# Patient Record
Sex: MALE | Race: WHITE | NOT HISPANIC OR LATINO | Employment: OTHER | URBAN - METROPOLITAN AREA
[De-identification: names, ages, dates, MRNs, and addresses within clinical notes are randomized per-mention and may not be internally consistent; named-entity substitution may affect disease eponyms.]

---

## 2017-09-01 ENCOUNTER — APPOINTMENT (OUTPATIENT)
Dept: GENERAL RADIOLOGY | Facility: CLINIC | Age: 67
DRG: 382 | End: 2017-09-01
Attending: NURSE PRACTITIONER
Payer: COMMERCIAL

## 2017-09-01 ENCOUNTER — APPOINTMENT (OUTPATIENT)
Dept: CT IMAGING | Facility: CLINIC | Age: 67
DRG: 382 | End: 2017-09-01
Attending: EMERGENCY MEDICINE
Payer: COMMERCIAL

## 2017-09-01 ENCOUNTER — APPOINTMENT (OUTPATIENT)
Dept: ULTRASOUND IMAGING | Facility: CLINIC | Age: 67
DRG: 382 | End: 2017-09-01
Attending: NURSE PRACTITIONER
Payer: COMMERCIAL

## 2017-09-01 ENCOUNTER — HOSPITAL ENCOUNTER (INPATIENT)
Facility: CLINIC | Age: 67
LOS: 2 days | Discharge: HOME | DRG: 382 | End: 2017-09-03
Attending: NURSE PRACTITIONER | Admitting: FAMILY MEDICINE
Payer: COMMERCIAL

## 2017-09-01 DIAGNOSIS — R11.2 INTRACTABLE VOMITING WITH NAUSEA, UNSPECIFIED VOMITING TYPE: ICD-10-CM

## 2017-09-01 DIAGNOSIS — K29.00 ACUTE GASTRITIS WITHOUT HEMORRHAGE, UNSPECIFIED GASTRITIS TYPE: Primary | ICD-10-CM

## 2017-09-01 DIAGNOSIS — Z79.1 ENCOUNTER FOR LONG-TERM (CURRENT) USE OF NON-STEROIDAL ANTI-INFLAMMATORIES: ICD-10-CM

## 2017-09-01 DIAGNOSIS — K31.1 GASTRIC OUTLET OBSTRUCTION: ICD-10-CM

## 2017-09-01 LAB
ALBUMIN SERPL-MCNC: 4.6 G/DL (ref 3.4–5)
ALP SERPL-CCNC: 54 U/L (ref 40–150)
ALT SERPL W P-5'-P-CCNC: 27 U/L (ref 0–70)
AMYLASE SERPL-CCNC: 69 U/L (ref 30–110)
ANION GAP SERPL CALCULATED.3IONS-SCNC: 7 MMOL/L (ref 3–14)
AST SERPL W P-5'-P-CCNC: 19 U/L (ref 0–45)
BASOPHILS # BLD AUTO: 0 10E9/L (ref 0–0.2)
BASOPHILS NFR BLD AUTO: 0.3 %
BILIRUB SERPL-MCNC: 0.8 MG/DL (ref 0.2–1.3)
BUN SERPL-MCNC: 22 MG/DL (ref 7–30)
CALCIUM SERPL-MCNC: 11.5 MG/DL (ref 8.5–10.1)
CHLORIDE SERPL-SCNC: 101 MMOL/L (ref 94–109)
CO2 SERPL-SCNC: 32 MMOL/L (ref 20–32)
CREAT SERPL-MCNC: 1.35 MG/DL (ref 0.66–1.25)
DIFFERENTIAL METHOD BLD: NORMAL
EOSINOPHIL # BLD AUTO: 0 10E9/L (ref 0–0.7)
EOSINOPHIL NFR BLD AUTO: 0.1 %
ERYTHROCYTE [DISTWIDTH] IN BLOOD BY AUTOMATED COUNT: 12.6 % (ref 10–15)
GFR SERPL CREATININE-BSD FRML MDRD: 53 ML/MIN/1.7M2
GLUCOSE SERPL-MCNC: 117 MG/DL (ref 70–99)
HCT VFR BLD AUTO: 46.2 % (ref 40–53)
HGB BLD-MCNC: 15.6 G/DL (ref 13.3–17.7)
IMM GRANULOCYTES # BLD: 0 10E9/L (ref 0–0.4)
IMM GRANULOCYTES NFR BLD: 0.2 %
LIPASE SERPL-CCNC: 442 U/L (ref 73–393)
LYMPHOCYTES # BLD AUTO: 1.3 10E9/L (ref 0.8–5.3)
LYMPHOCYTES NFR BLD AUTO: 13.5 %
MCH RBC QN AUTO: 29.9 PG (ref 26.5–33)
MCHC RBC AUTO-ENTMCNC: 33.8 G/DL (ref 31.5–36.5)
MCV RBC AUTO: 89 FL (ref 78–100)
MONOCYTES # BLD AUTO: 0.5 10E9/L (ref 0–1.3)
MONOCYTES NFR BLD AUTO: 4.6 %
NEUTROPHILS # BLD AUTO: 8.1 10E9/L (ref 1.6–8.3)
NEUTROPHILS NFR BLD AUTO: 81.3 %
PLATELET # BLD AUTO: 336 10E9/L (ref 150–450)
POTASSIUM SERPL-SCNC: 4.2 MMOL/L (ref 3.4–5.3)
PROT SERPL-MCNC: 8.5 G/DL (ref 6.8–8.8)
RBC # BLD AUTO: 5.22 10E12/L (ref 4.4–5.9)
SODIUM SERPL-SCNC: 140 MMOL/L (ref 133–144)
WBC # BLD AUTO: 9.9 10E9/L (ref 4–11)

## 2017-09-01 PROCEDURE — 99223 1ST HOSP IP/OBS HIGH 75: CPT | Mod: AI | Performed by: FAMILY MEDICINE

## 2017-09-01 PROCEDURE — 96365 THER/PROPH/DIAG IV INF INIT: CPT | Mod: 59 | Performed by: EMERGENCY MEDICINE

## 2017-09-01 PROCEDURE — 25000128 H RX IP 250 OP 636: Performed by: EMERGENCY MEDICINE

## 2017-09-01 PROCEDURE — 0DB68ZX EXCISION OF STOMACH, VIA NATURAL OR ARTIFICIAL OPENING ENDOSCOPIC, DIAGNOSTIC: ICD-10-PCS | Performed by: SURGERY

## 2017-09-01 PROCEDURE — 74177 CT ABD & PELVIS W/CONTRAST: CPT

## 2017-09-01 PROCEDURE — S0164 INJECTION PANTROPRAZOLE: HCPCS | Performed by: EMERGENCY MEDICINE

## 2017-09-01 PROCEDURE — 25000128 H RX IP 250 OP 636: Performed by: FAMILY MEDICINE

## 2017-09-01 PROCEDURE — 76705 ECHO EXAM OF ABDOMEN: CPT

## 2017-09-01 PROCEDURE — 43239 EGD BIOPSY SINGLE/MULTIPLE: CPT | Performed by: SURGERY

## 2017-09-01 PROCEDURE — 83690 ASSAY OF LIPASE: CPT | Performed by: FAMILY MEDICINE

## 2017-09-01 PROCEDURE — 96361 HYDRATE IV INFUSION ADD-ON: CPT | Performed by: EMERGENCY MEDICINE

## 2017-09-01 PROCEDURE — 82977 ASSAY OF GGT: CPT | Performed by: EMERGENCY MEDICINE

## 2017-09-01 PROCEDURE — 82150 ASSAY OF AMYLASE: CPT | Performed by: FAMILY MEDICINE

## 2017-09-01 PROCEDURE — 25000128 H RX IP 250 OP 636: Performed by: NURSE PRACTITIONER

## 2017-09-01 PROCEDURE — 25000125 ZZHC RX 250: Performed by: EMERGENCY MEDICINE

## 2017-09-01 PROCEDURE — 74020 XR ABDOMEN 2 VW: CPT

## 2017-09-01 PROCEDURE — 99285 EMERGENCY DEPT VISIT HI MDM: CPT | Mod: 25 | Performed by: EMERGENCY MEDICINE

## 2017-09-01 PROCEDURE — 96367 TX/PROPH/DG ADDL SEQ IV INF: CPT | Performed by: EMERGENCY MEDICINE

## 2017-09-01 PROCEDURE — 12000007 ZZH R&B INTERMEDIATE

## 2017-09-01 PROCEDURE — 96375 TX/PRO/DX INJ NEW DRUG ADDON: CPT | Performed by: EMERGENCY MEDICINE

## 2017-09-01 PROCEDURE — 99207 ZZC CDG-CHARGE REQUIRED MANUAL ENTRY: CPT | Performed by: FAMILY MEDICINE

## 2017-09-01 PROCEDURE — 85025 COMPLETE CBC W/AUTO DIFF WBC: CPT | Performed by: FAMILY MEDICINE

## 2017-09-01 PROCEDURE — 96376 TX/PRO/DX INJ SAME DRUG ADON: CPT | Performed by: EMERGENCY MEDICINE

## 2017-09-01 PROCEDURE — 80053 COMPREHEN METABOLIC PANEL: CPT | Performed by: FAMILY MEDICINE

## 2017-09-01 PROCEDURE — 99284 EMERGENCY DEPT VISIT MOD MDM: CPT | Mod: Z6 | Performed by: EMERGENCY MEDICINE

## 2017-09-01 RX ORDER — LIDOCAINE 40 MG/G
CREAM TOPICAL
Status: DISCONTINUED | OUTPATIENT
Start: 2017-09-01 | End: 2017-09-01

## 2017-09-01 RX ORDER — PROCHLORPERAZINE MALEATE 5 MG
5 TABLET ORAL EVERY 6 HOURS PRN
Status: DISCONTINUED | OUTPATIENT
Start: 2017-09-01 | End: 2017-09-01

## 2017-09-01 RX ORDER — NALOXONE HYDROCHLORIDE 0.4 MG/ML
.1-.4 INJECTION, SOLUTION INTRAMUSCULAR; INTRAVENOUS; SUBCUTANEOUS
Status: DISCONTINUED | OUTPATIENT
Start: 2017-09-01 | End: 2017-09-03 | Stop reason: HOSPADM

## 2017-09-01 RX ORDER — PROCHLORPERAZINE 25 MG
12.5 SUPPOSITORY, RECTAL RECTAL EVERY 12 HOURS PRN
Status: DISCONTINUED | OUTPATIENT
Start: 2017-09-01 | End: 2017-09-01

## 2017-09-01 RX ORDER — SODIUM CHLORIDE 9 MG/ML
1000 INJECTION, SOLUTION INTRAVENOUS CONTINUOUS
Status: DISCONTINUED | OUTPATIENT
Start: 2017-09-01 | End: 2017-09-02

## 2017-09-01 RX ORDER — ONDANSETRON 2 MG/ML
4 INJECTION INTRAMUSCULAR; INTRAVENOUS
Status: COMPLETED | OUTPATIENT
Start: 2017-09-01 | End: 2017-09-01

## 2017-09-01 RX ORDER — PROCHLORPERAZINE 25 MG
12.5 SUPPOSITORY, RECTAL RECTAL EVERY 12 HOURS PRN
Status: DISCONTINUED | OUTPATIENT
Start: 2017-09-01 | End: 2017-09-03 | Stop reason: HOSPADM

## 2017-09-01 RX ORDER — ONDANSETRON 4 MG/1
4 TABLET, ORALLY DISINTEGRATING ORAL
Status: DISCONTINUED | OUTPATIENT
Start: 2017-09-01 | End: 2017-09-01

## 2017-09-01 RX ORDER — ONDANSETRON 4 MG/1
4 TABLET, ORALLY DISINTEGRATING ORAL EVERY 6 HOURS PRN
Status: DISCONTINUED | OUTPATIENT
Start: 2017-09-01 | End: 2017-09-03 | Stop reason: HOSPADM

## 2017-09-01 RX ORDER — ONDANSETRON 2 MG/ML
4 INJECTION INTRAMUSCULAR; INTRAVENOUS ONCE
Status: COMPLETED | OUTPATIENT
Start: 2017-09-01 | End: 2017-09-01

## 2017-09-01 RX ORDER — ONDANSETRON 2 MG/ML
4 INJECTION INTRAMUSCULAR; INTRAVENOUS EVERY 6 HOURS PRN
Status: DISCONTINUED | OUTPATIENT
Start: 2017-09-01 | End: 2017-09-03 | Stop reason: HOSPADM

## 2017-09-01 RX ORDER — ONDANSETRON 2 MG/ML
4 INJECTION INTRAMUSCULAR; INTRAVENOUS EVERY 6 HOURS PRN
Status: DISCONTINUED | OUTPATIENT
Start: 2017-09-01 | End: 2017-09-01

## 2017-09-01 RX ORDER — SODIUM CHLORIDE, SODIUM LACTATE, POTASSIUM CHLORIDE, CALCIUM CHLORIDE 600; 310; 30; 20 MG/100ML; MG/100ML; MG/100ML; MG/100ML
INJECTION, SOLUTION INTRAVENOUS CONTINUOUS
Status: DISCONTINUED | OUTPATIENT
Start: 2017-09-02 | End: 2017-09-03

## 2017-09-01 RX ORDER — ONDANSETRON 4 MG/1
4 TABLET, ORALLY DISINTEGRATING ORAL EVERY 6 HOURS PRN
Status: DISCONTINUED | OUTPATIENT
Start: 2017-09-01 | End: 2017-09-01

## 2017-09-01 RX ORDER — LATANOPROST 50 UG/ML
1 SOLUTION/ DROPS OPHTHALMIC AT BEDTIME
COMMUNITY

## 2017-09-01 RX ORDER — HYDROMORPHONE HYDROCHLORIDE 1 MG/ML
.3-.5 INJECTION, SOLUTION INTRAMUSCULAR; INTRAVENOUS; SUBCUTANEOUS
Status: DISCONTINUED | OUTPATIENT
Start: 2017-09-01 | End: 2017-09-03

## 2017-09-01 RX ORDER — LATANOPROST 50 UG/ML
1 SOLUTION/ DROPS OPHTHALMIC AT BEDTIME
Status: DISCONTINUED | OUTPATIENT
Start: 2017-09-02 | End: 2017-09-03 | Stop reason: HOSPADM

## 2017-09-01 RX ORDER — METOCLOPRAMIDE 5 MG/1
5 TABLET ORAL EVERY 6 HOURS PRN
Status: DISCONTINUED | OUTPATIENT
Start: 2017-09-01 | End: 2017-09-03 | Stop reason: HOSPADM

## 2017-09-01 RX ORDER — METOCLOPRAMIDE HYDROCHLORIDE 5 MG/ML
5 INJECTION INTRAMUSCULAR; INTRAVENOUS EVERY 6 HOURS PRN
Status: DISCONTINUED | OUTPATIENT
Start: 2017-09-01 | End: 2017-09-03 | Stop reason: HOSPADM

## 2017-09-01 RX ORDER — POLYETHYLENE GLYCOL 3350 17 G/17G
1 POWDER, FOR SOLUTION ORAL DAILY
COMMUNITY

## 2017-09-01 RX ORDER — PROCHLORPERAZINE MALEATE 5 MG
5 TABLET ORAL EVERY 6 HOURS PRN
Status: DISCONTINUED | OUTPATIENT
Start: 2017-09-01 | End: 2017-09-03 | Stop reason: HOSPADM

## 2017-09-01 RX ORDER — ACETAMINOPHEN 325 MG/1
650 TABLET ORAL EVERY 4 HOURS PRN
Status: DISCONTINUED | OUTPATIENT
Start: 2017-09-01 | End: 2017-09-03 | Stop reason: HOSPADM

## 2017-09-01 RX ORDER — IOPAMIDOL 755 MG/ML
100 INJECTION, SOLUTION INTRAVASCULAR ONCE
Status: COMPLETED | OUTPATIENT
Start: 2017-09-01 | End: 2017-09-01

## 2017-09-01 RX ADMIN — ONDANSETRON 4 MG: 2 INJECTION INTRAMUSCULAR; INTRAVENOUS at 18:27

## 2017-09-01 RX ADMIN — SODIUM CHLORIDE 8 MG/HR: 9 INJECTION, SOLUTION INTRAVENOUS at 22:29

## 2017-09-01 RX ADMIN — SODIUM CHLORIDE 1000 ML: 9 INJECTION, SOLUTION INTRAVENOUS at 17:11

## 2017-09-01 RX ADMIN — IOPAMIDOL 100 ML: 755 INJECTION, SOLUTION INTRAVENOUS at 21:51

## 2017-09-01 RX ADMIN — SODIUM CHLORIDE 1000 ML: 9 INJECTION, SOLUTION INTRAVENOUS at 19:40

## 2017-09-01 RX ADMIN — PROCHLORPERAZINE EDISYLATE 10 MG: 5 INJECTION INTRAMUSCULAR; INTRAVENOUS at 19:36

## 2017-09-01 RX ADMIN — SODIUM CHLORIDE 66 ML: 9 INJECTION, SOLUTION INTRAVENOUS at 21:51

## 2017-09-01 RX ADMIN — ONDANSETRON 4 MG: 2 INJECTION INTRAMUSCULAR; INTRAVENOUS at 17:12

## 2017-09-01 RX ADMIN — DIATRIZOATE MEGLUMINE AND DIATRIZOATE SODIUM 240 ML: 660; 100 SOLUTION ORAL; RECTAL at 21:51

## 2017-09-01 RX ADMIN — SODIUM CHLORIDE 80 MG: 9 INJECTION, SOLUTION INTRAVENOUS at 22:26

## 2017-09-01 ASSESSMENT — ENCOUNTER SYMPTOMS
HEADACHES: 0
BACK PAIN: 0
BLOOD IN STOOL: 0
ABDOMINAL DISTENTION: 1
NAUSEA: 1
DIARRHEA: 1
FLANK PAIN: 0
CONSTIPATION: 1
CHILLS: 1
FATIGUE: 0
SHORTNESS OF BREATH: 0
DIZZINESS: 0
DYSURIA: 0
COUGH: 0
ACTIVITY CHANGE: 0
FEVER: 0
ABDOMINAL PAIN: 1
DIFFICULTY URINATING: 0
SORE THROAT: 0
VOMITING: 1
APPETITE CHANGE: 1

## 2017-09-01 NOTE — ED PROVIDER NOTES
"  History     Chief Complaint   Patient presents with     Vomiting     going between constipation and diarrhea all week, vomiting last 24 hours     HPI  Diogo Wilburn is a 67 year old male with history of hyperlipidemia who presents to the emergency department for evaluation of vomiting and abdominal pain.  Patient describes a chronic history of diarrhea alternating with constipation for the last 4-5 years.  Reports over the last week he has had issues with diarrhea and constipation and has been trying multiple OTC oral medications (duclulax and mirilax).  Last evening he started having chills and nausea.  Vomiting started during the night. Three more episodes of vomiting and upper abdominal pain today. Vomiting consists of undigested food and liquid.  Denies any vomiting blood. Abdomen feels \"bloated\". Unable to eat or drink.  Patient 1-2 ETOH drinks a day (Vodka in the warmer months and Iveth in the cold months).  Quite smoking several years ago.     I have reviewed the Medications, Allergies, Past Medical and Surgical History, and Social History in the Epic system.    Meds: Fenofibrate and Simvastatin  PMH: hyperlipidemia, hypertriglyceridemia. Gaucoma.   Surg hx: umbilical hernia repair  Soc hx: Quit smoking in 1984. ETOH 1-2 liquor drinks daily (vodka or iveth). Denies illicit drug use.     Review of Systems   Constitutional: Positive for appetite change and chills. Negative for activity change, fatigue and fever.   HENT: Negative for congestion, ear pain and sore throat.    Respiratory: Negative for cough and shortness of breath.    Cardiovascular: Negative for chest pain.   Gastrointestinal: Positive for abdominal distention, abdominal pain, constipation, diarrhea, nausea and vomiting. Negative for blood in stool.   Genitourinary: Negative for difficulty urinating, dysuria and flank pain.   Musculoskeletal: Negative for back pain.   Skin: Negative for rash.   Neurological: Negative for dizziness and " headaches.       Physical Exam   BP: 153/83  Heart Rate: 61  Temp: 98.2  F (36.8  C)  Resp: 15  SpO2: 98 %  Physical Exam   Constitutional: He is oriented to person, place, and time. He appears well-developed and well-nourished. He appears distressed.   HENT:   Head: Normocephalic and atraumatic.   Right Ear: External ear normal.   Left Ear: External ear normal.   Nose: Nose normal.   Mouth/Throat: Oropharynx is clear and moist.   Eyes: Conjunctivae and EOM are normal.   Neck: Normal range of motion. Neck supple.   Cardiovascular: Normal rate, regular rhythm and normal heart sounds.    No murmur heard.  Pulmonary/Chest: Effort normal and breath sounds normal. No respiratory distress. He has no wheezes. He has no rales.   Abdominal: Soft. Bowel sounds are decreased. There is no hepatosplenomegaly. There is tenderness in the right upper quadrant and epigastric area. There is no rigidity, no rebound and no guarding.       Musculoskeletal: Normal range of motion.   Neurological: He is alert and oriented to person, place, and time.   Skin: Skin is warm and dry.       ED Course     ED Course     Procedures            Results for orders placed or performed during the hospital encounter of 09/01/17 (from the past 48 hour(s))   CBC with platelets, differential   Result Value Ref Range    WBC 9.9 4.0 - 11.0 10e9/L    RBC Count 5.22 4.4 - 5.9 10e12/L    Hemoglobin 15.6 13.3 - 17.7 g/dL    Hematocrit 46.2 40.0 - 53.0 %    MCV 89 78 - 100 fl    MCH 29.9 26.5 - 33.0 pg    MCHC 33.8 31.5 - 36.5 g/dL    RDW 12.6 10.0 - 15.0 %    Platelet Count 336 150 - 450 10e9/L    Diff Method Automated Method     % Neutrophils 81.3 %    % Lymphocytes 13.5 %    % Monocytes 4.6 %    % Eosinophils 0.1 %    % Basophils 0.3 %    % Immature Granulocytes 0.2 %    Absolute Neutrophil 8.1 1.6 - 8.3 10e9/L    Absolute Lymphocytes 1.3 0.8 - 5.3 10e9/L    Absolute Monocytes 0.5 0.0 - 1.3 10e9/L    Absolute Eosinophils 0.0 0.0 - 0.7 10e9/L    Absolute  Basophils 0.0 0.0 - 0.2 10e9/L    Abs Immature Granulocytes 0.0 0 - 0.4 10e9/L   Comprehensive metabolic panel   Result Value Ref Range    Sodium 140 133 - 144 mmol/L    Potassium 4.2 3.4 - 5.3 mmol/L    Chloride 101 94 - 109 mmol/L    Carbon Dioxide 32 20 - 32 mmol/L    Anion Gap 7 3 - 14 mmol/L    Glucose 117 (H) 70 - 99 mg/dL    Urea Nitrogen 22 7 - 30 mg/dL    Creatinine 1.35 (H) 0.66 - 1.25 mg/dL    GFR Estimate 53 (L) >60 mL/min/1.7m2    GFR Estimate If Black 64 >60 mL/min/1.7m2    Calcium 11.5 (H) 8.5 - 10.1 mg/dL    Bilirubin Total 0.8 0.2 - 1.3 mg/dL    Albumin 4.6 3.4 - 5.0 g/dL    Protein Total 8.5 6.8 - 8.8 g/dL    Alkaline Phosphatase 54 40 - 150 U/L    ALT 27 0 - 70 U/L    AST 19 0 - 45 U/L   Lipase   Result Value Ref Range    Lipase 442 (H) 73 - 393 U/L   Amylase   Result Value Ref Range    Amylase 69 30 - 110 U/L   XR Abdomen 2 Views    Narrative    XR ABDOMEN 2 VW   9/1/2017 5:29 PM      HISTORY:  vomiting, constipation    COMPARISON: None.    FINDINGS: The gas pattern is normal. No free air. There are radiopaque  densities projected over the right lower quadrant. These are probably  within the GI tract and are probably secondary to enteric tablets. The  stomach appears to be distended.      Impression    IMPRESSION:  No free air. No obstruction identified.. Stomach appears  distended.         Labs Ordered and Resulted from Time of ED Arrival Up to the Time of Departure from the ED - No data to display    Assessments & Plan (with Medical Decision Making)  Diogo Wilburn is a 67 year old male with history of hyperlipidemia who presents to the emergency department for evaluation of vomiting and abdominal pain.  Patient describes a chronic history of diarrhea alternating with constipation for the last 4-5 years.  Reports over the last week he has had issues with diarrhea and constipation and has been trying multiple OTC oral medications (duclulax and mirilax).  Last evening he started having chills  "and nausea.  Vomiting started during the night. Three more episodes of vomiting and upper abdominal pain today. Vomiting consists of undigested food and liquid.  Denies any vomiting blood. Abdomen feels \"bloated\". Unable to eat or drink.  Patient 1-2 ETOH drinks a day (Vodka in the warmer months and Iveth in the cold months).  Quite smoking several years ago.     On exam patient is pleasant, alert and oriented. Does not appear distressed.  Lung sounds CTA. No respiratory distress. Tenderness with palpation of the right upper quadrant and epigastric region.  Bowel sounds are hypoactive. Patient's abdomen is soft, but does appear distended. CBC normal. Lipase elevated at 442. Amylase is normal. LFTs are normal. Creatinine is elevated at 1.35. GFR low at 53. BUN is normal. Abdominal xray reveals no free air or obstruction. Stomach appears distended on xray. I discussed labs and abdominal xray with patient. Patient has another vomiting episode of non-bloody emesis approximately 300cc. Given his abdominal tenderness and elevated lipase ultrasound was ordered. I discussed patient's history, exam, lab and imaging findings thus far with Dr. Tyree Siddiqi, emergency provider who I signed patient out to and will assume care of patient here in the ED.   8:34 PM  I reviewed the flat and upright abdominal x-ray along with ultrasound report.  Concerns for the patient's epigastric pain along with recurrent vomiting is that of gastric obstruction.  Undetermined etiology.  Patient has significant gastric distention along with recurrent vomiting in the ED.  He is continuing to vomit despite multiple antiemetic agents.  NG tube will be placed.  CT with IV contrast only following placement of NG tube.  Hospital admission is planned.  Discussed admission with Cleveland Garcia's M.D. -accepting.  Patient in agreement.  Wife was sent home for CPAP device.  8:48 PM  Plan to start PPI therapy IV bolus followed by drip.  Discussed care with Matthew" Christian DIAZ-reconsult completed.  Tentatively scheduled for EGD 9 AM tomorrow morning.  Surgeries requesting CT to be completed with contrast of NG tube to see if it completed obstructed the pylorus of some contrast is getting through.  Discussed this with the CT technician who will discuss this with the radiologist.     Patient's had no night sweats or unexplained weight loss.  Does use heavy NSAID use for chronic back pain.         I have reviewed the nursing notes.    I have reviewed the findings, diagnosis, plan and need for follow up with the patient.    New Prescriptions    No medications on file       Final diagnoses:   Gastric outlet obstruction   Intractable vomiting with nausea, unspecified vomiting type   Heavy NSAID use  Moderate to heavy alcohol use    9/1/2017   [facility] EMERGENCY DEPARTMENT     Diogo Siddiqi DO  09/01/17 7533

## 2017-09-01 NOTE — ED NOTES
BS hypoactive. Pt has not been able to keep fluids down x24 hours. Been on miralax previously, had not taken over last few days. Attempted OTC bowel meds (ex-lax)  while on vacation due to not having miralax with on trip.

## 2017-09-01 NOTE — IP AVS SNAPSHOT
[facility]    [address] [address]    Phone:  [phone]    Fax:  [phone]                                       After Visit Summary   9/1/2017    Diogo Wilburn    MRN: [id number]           After Visit Summary Signature Page     I have received my discharge instructions, and my questions have been answered. I have discussed any challenges I see with this plan with the nurse or doctor.    ..........................................................................................................................................  Patient/Patient Representative Signature      ..........................................................................................................................................  Patient Representative Print Name and Relationship to Patient    ..................................................               ................................................  Date                                            Time    ..........................................................................................................................................  Reviewed by Signature/Title    ...................................................              ..............................................  Date                                                            Time

## 2017-09-01 NOTE — IP AVS SNAPSHOT
MRN:[id number]                      After Visit Summary   9/1/2017    Diogo Wilburn    MRN: [id number]           Thank you!     Thank you for choosing [facility] for your care. Our goal is always to provide you with excellent care. Hearing back from our patients is one way we can continue to improve our services. Please take a few minutes to complete the written survey that you may receive in the mail after you visit with us. Thank you!        Patient Information     Date Of Birth          1950        Designated Caregiver       Most Recent Value    Caregiver    Will someone help with your care after discharge? yes    Name of designated caregiver  Corrie Wilburn wife     Phone number of caregiver [phone]    Caregiver address same as patients       About your hospital stay     You were admitted on:  September 1, 2017 You last received care in the:  [facility]    You were discharged on:  September 3, 2017       Who to Call     For medical emergencies, please call 911.  For non-urgent questions about your medical care, please call your primary care provider or clinic, None  For questions related to your surgery, please call your surgery clinic        Attending Provider     Provider Specialty    Starla Huang APRN CNP Nurse Practitioner - Pediatrics    Diogo Siddiqi,  Emergency Medicine    Cleveland Hollingsworth MD Family Practice    [facility], Dillan FERRER MD Family Practice       Primary Care Provider Fax #    [facility] [phone]      Further instructions from your care team       Follow-up with your primary care provider in about 1 week.  Have them recheck your hemoglobin and discuss how long to continue the twice daily omeprazole vs decreasing to once daily at that time.      Pending Results     No orders found from 8/30/2017 to 9/2/2017.            Statement of Approval     Ordered          09/03/17 1351  I have reviewed and agree  "with all the recommendations and orders detailed in this document.  EFFECTIVE NOW     Approved and electronically signed by:  Dillan Troy MD             Admission Information     Date & Time Provider Department Dept. Phone    2017 Dillan Troy MD [facility] Surgical [phone]      Your Vitals Were     Blood Pressure Pulse Temperature Respirations Height Weight    109/63 (BP Location: Left arm) 50 98.1  F (36.7  C) (Oral) 18 1.778 m (5' 10\") 99 kg (218 lb 4.1 oz)    Pulse Oximetry BMI (Body Mass Index)                96% 31.32 kg/m2          MyChart Information     The Simple lets you send messages to your doctor, view your test results, renew your prescriptions, schedule appointments and more. To sign up, go to [email or web address] . Click on \"Log in\" on the left side of the screen, which will take you to the Welcome page. Then click on \"Sign up Now\" on the right side of the page.     You will be asked to enter the access code listed below, as well as some personal information. Please follow the directions to create your username and password.     Your access code is: [email or web address]  Expires: 2017  6:27 PM     Your access code will  in 90 days. If you need help or a new code, please call your [facility] clinic or [phone].        Care EveryWhere ID     This is your Care EveryWhere ID. This could be used by other organizations to access your [facility] medical records  [id number]        Equal Access to Services     TOM LOUISE : Hadii klaus adams Sochristopher, waaxda luqadaha, qaybta kaalmasammi mcbride, emeka marques . So [facility] [phone].    ATENCIÓN: Si habla español, tiene a garcia disposición servicios gratuitos de asistencia lingüística. Llame al [phone].    We comply with applicable federal civil rights laws and [place] laws. We do not discriminate on the basis of race, color, national origin, age, disability sex, sexual orientation or " gender identity.               Review of your medicines      START taking        Dose / Directions    HYDROcodone-acetaminophen 5-325 MG per tablet   Commonly known as:  NORCO   Used for:  Acute gastritis without hemorrhage, unspecified gastritis type        Dose:  1 tablet   Take 1 tablet by mouth every 4 hours as needed for pain maximum 4 tablet(s) per day   Quantity:  10 tablet   Refills:  0       omeprazole 20 MG CR capsule   Commonly known as:  priLOSEC   Used for:  Acute gastritis without hemorrhage, unspecified gastritis type        Dose:  20 mg   Take 1 capsule (20 mg) by mouth 2 times daily   Quantity:  60 capsule   Refills:  1         CONTINUE these medicines which have NOT CHANGED        Dose / Directions    aspirin 81 MG tablet   Used for:  Unspecified disorder of lipoid metabolism        ONE DAILY   Quantity:  100   Refills:  3       FENOFIBRATE PO        Dose:  145 mg   Take 145 mg by mouth daily   Refills:  0       latanoprost 0.005 % ophthalmic solution   Commonly known as:  XALATAN        Dose:  1 drop   Place 1 drop into both eyes At Bedtime   Refills:  0       polyethylene glycol powder   Commonly known as:  MIRALAX/GLYCOLAX        Dose:  1 capful   Take 1 capful by mouth daily   Refills:  0       SIMVASTATIN PO        Dose:  20 mg   Take 20 mg by mouth At Bedtime   Refills:  0            Where to get your medicines      Some of these will need a paper prescription and others can be bought over the counter. Ask your nurse if you have questions.     Bring a paper prescription for each of these medications     HYDROcodone-acetaminophen 5-325 MG per tablet    omeprazole 20 MG CR capsule                Protect others around you: Learn how to safely use, store and throw away your medicines at www.disposemymeds.org.             Medication List: This is a list of all your medications and when to take them. Check marks below indicate your daily home schedule. Keep this list as a reference.       Medications           Morning Afternoon Evening Bedtime As Needed    aspirin 81 MG tablet   ONE DAILY                                FENOFIBRATE PO   Take 145 mg by mouth daily                                HYDROcodone-acetaminophen 5-325 MG per tablet   Commonly known as:  NORCO   Take 1 tablet by mouth every 4 hours as needed for pain maximum 4 tablet(s) per day                                latanoprost 0.005 % ophthalmic solution   Commonly known as:  XALATAN   Place 1 drop into both eyes At Bedtime   Last time this was given:  1 drop on 9/2/2017  9:01 PM                                omeprazole 20 MG CR capsule   Commonly known as:  priLOSEC   Take 1 capsule (20 mg) by mouth 2 times daily                                polyethylene glycol powder   Commonly known as:  MIRALAX/GLYCOLAX   Take 1 capful by mouth daily                                SIMVASTATIN PO   Take 20 mg by mouth At Bedtime   Last time this was given:  20 mg on 9/2/2017  9:01 PM

## 2017-09-02 ENCOUNTER — ANESTHESIA EVENT (OUTPATIENT)
Dept: GASTROENTEROLOGY | Facility: CLINIC | Age: 67
DRG: 382 | End: 2017-09-02
Payer: COMMERCIAL

## 2017-09-02 ENCOUNTER — ANESTHESIA (OUTPATIENT)
Dept: GASTROENTEROLOGY | Facility: CLINIC | Age: 67
DRG: 382 | End: 2017-09-02
Payer: COMMERCIAL

## 2017-09-02 LAB
ALBUMIN SERPL-MCNC: 3.7 G/DL (ref 3.4–5)
ALP SERPL-CCNC: 42 U/L (ref 40–150)
ALT SERPL W P-5'-P-CCNC: 20 U/L (ref 0–70)
ANION GAP SERPL CALCULATED.3IONS-SCNC: 8 MMOL/L (ref 3–14)
AST SERPL W P-5'-P-CCNC: 13 U/L (ref 0–45)
BILIRUB SERPL-MCNC: 0.8 MG/DL (ref 0.2–1.3)
BUN SERPL-MCNC: 22 MG/DL (ref 7–30)
CALCIUM SERPL-MCNC: 9.7 MG/DL (ref 8.5–10.1)
CHLORIDE SERPL-SCNC: 108 MMOL/L (ref 94–109)
CO2 SERPL-SCNC: 28 MMOL/L (ref 20–32)
CREAT SERPL-MCNC: 1.46 MG/DL (ref 0.66–1.25)
ERYTHROCYTE [DISTWIDTH] IN BLOOD BY AUTOMATED COUNT: 12.8 % (ref 10–15)
GFR SERPL CREATININE-BSD FRML MDRD: 48 ML/MIN/1.7M2
GGT SERPL-CCNC: 14 U/L (ref 0–75)
GLUCOSE SERPL-MCNC: 108 MG/DL (ref 70–99)
HCT VFR BLD AUTO: 41.2 % (ref 40–53)
HGB BLD-MCNC: 13.7 G/DL (ref 13.3–17.7)
INR PPP: 1.01 (ref 0.86–1.14)
LIPASE SERPL-CCNC: 219 U/L (ref 73–393)
MCH RBC QN AUTO: 29.9 PG (ref 26.5–33)
MCHC RBC AUTO-ENTMCNC: 33.3 G/DL (ref 31.5–36.5)
MCV RBC AUTO: 90 FL (ref 78–100)
PLATELET # BLD AUTO: 273 10E9/L (ref 150–450)
POTASSIUM SERPL-SCNC: 4 MMOL/L (ref 3.4–5.3)
PROT SERPL-MCNC: 6.9 G/DL (ref 6.8–8.8)
PTH-INTACT SERPL-MCNC: 33 PG/ML (ref 12–72)
RBC # BLD AUTO: 4.58 10E12/L (ref 4.4–5.9)
SODIUM SERPL-SCNC: 144 MMOL/L (ref 133–144)
UPPER GI ENDOSCOPY: NORMAL
WBC # BLD AUTO: 8.9 10E9/L (ref 4–11)

## 2017-09-02 PROCEDURE — 85610 PROTHROMBIN TIME: CPT | Performed by: FAMILY MEDICINE

## 2017-09-02 PROCEDURE — 88342 IMHCHEM/IMCYTCHM 1ST ANTB: CPT | Performed by: SURGERY

## 2017-09-02 PROCEDURE — 88305 TISSUE EXAM BY PATHOLOGIST: CPT | Performed by: SURGERY

## 2017-09-02 PROCEDURE — 25000128 H RX IP 250 OP 636: Performed by: EMERGENCY MEDICINE

## 2017-09-02 PROCEDURE — 83970 ASSAY OF PARATHORMONE: CPT | Performed by: FAMILY MEDICINE

## 2017-09-02 PROCEDURE — 25000132 ZZH RX MED GY IP 250 OP 250 PS 637: Performed by: FAMILY MEDICINE

## 2017-09-02 PROCEDURE — 25000128 H RX IP 250 OP 636: Performed by: NURSE ANESTHETIST, CERTIFIED REGISTERED

## 2017-09-02 PROCEDURE — 43251 EGD REMOVE LESION SNARE: CPT | Performed by: SURGERY

## 2017-09-02 PROCEDURE — 85027 COMPLETE CBC AUTOMATED: CPT | Performed by: FAMILY MEDICINE

## 2017-09-02 PROCEDURE — 37000008 ZZH ANESTHESIA TECHNICAL FEE, 1ST 30 MIN: Performed by: SURGERY

## 2017-09-02 PROCEDURE — 36415 COLL VENOUS BLD VENIPUNCTURE: CPT | Performed by: FAMILY MEDICINE

## 2017-09-02 PROCEDURE — 99232 SBSQ HOSP IP/OBS MODERATE 35: CPT | Performed by: FAMILY MEDICINE

## 2017-09-02 PROCEDURE — 25000125 ZZHC RX 250: Performed by: EMERGENCY MEDICINE

## 2017-09-02 PROCEDURE — 88342 IMHCHEM/IMCYTCHM 1ST ANTB: CPT | Mod: 26 | Performed by: SURGERY

## 2017-09-02 PROCEDURE — 80053 COMPREHEN METABOLIC PANEL: CPT | Performed by: FAMILY MEDICINE

## 2017-09-02 PROCEDURE — S0164 INJECTION PANTROPRAZOLE: HCPCS | Performed by: EMERGENCY MEDICINE

## 2017-09-02 PROCEDURE — 25000125 ZZHC RX 250: Performed by: NURSE ANESTHETIST, CERTIFIED REGISTERED

## 2017-09-02 PROCEDURE — 88305 TISSUE EXAM BY PATHOLOGIST: CPT | Mod: 26 | Performed by: SURGERY

## 2017-09-02 PROCEDURE — 83690 ASSAY OF LIPASE: CPT | Performed by: FAMILY MEDICINE

## 2017-09-02 PROCEDURE — 25000125 ZZHC RX 250: Performed by: FAMILY MEDICINE

## 2017-09-02 PROCEDURE — 12000000 ZZH R&B MED SURG/OB

## 2017-09-02 PROCEDURE — 12000007 ZZH R&B INTERMEDIATE

## 2017-09-02 RX ORDER — LIDOCAINE 40 MG/G
CREAM TOPICAL
Status: DISCONTINUED | OUTPATIENT
Start: 2017-09-02 | End: 2017-09-02 | Stop reason: HOSPADM

## 2017-09-02 RX ORDER — SODIUM CHLORIDE, SODIUM LACTATE, POTASSIUM CHLORIDE, CALCIUM CHLORIDE 600; 310; 30; 20 MG/100ML; MG/100ML; MG/100ML; MG/100ML
INJECTION, SOLUTION INTRAVENOUS CONTINUOUS PRN
Status: DISCONTINUED | OUTPATIENT
Start: 2017-09-02 | End: 2017-09-02

## 2017-09-02 RX ORDER — PROPOFOL 10 MG/ML
INJECTION, EMULSION INTRAVENOUS CONTINUOUS PRN
Status: DISCONTINUED | OUTPATIENT
Start: 2017-09-02 | End: 2017-09-02

## 2017-09-02 RX ORDER — LIDOCAINE HYDROCHLORIDE 10 MG/ML
INJECTION, SOLUTION EPIDURAL; INFILTRATION; INTRACAUDAL; PERINEURAL PRN
Status: DISCONTINUED | OUTPATIENT
Start: 2017-09-02 | End: 2017-09-02

## 2017-09-02 RX ORDER — GLYCOPYRROLATE 0.2 MG/ML
INJECTION, SOLUTION INTRAMUSCULAR; INTRAVENOUS PRN
Status: DISCONTINUED | OUTPATIENT
Start: 2017-09-02 | End: 2017-09-02

## 2017-09-02 RX ORDER — SIMVASTATIN 20 MG
20 TABLET ORAL AT BEDTIME
Status: DISCONTINUED | OUTPATIENT
Start: 2017-09-02 | End: 2017-09-03 | Stop reason: HOSPADM

## 2017-09-02 RX ORDER — PROPOFOL 10 MG/ML
INJECTION, EMULSION INTRAVENOUS PRN
Status: DISCONTINUED | OUTPATIENT
Start: 2017-09-02 | End: 2017-09-02

## 2017-09-02 RX ADMIN — PROPOFOL 1 MG: 10 INJECTION, EMULSION INTRAVENOUS at 11:51

## 2017-09-02 RX ADMIN — SODIUM CHLORIDE, POTASSIUM CHLORIDE, SODIUM LACTATE AND CALCIUM CHLORIDE: 600; 310; 30; 20 INJECTION, SOLUTION INTRAVENOUS at 11:46

## 2017-09-02 RX ADMIN — SIMVASTATIN 20 MG: 20 TABLET, FILM COATED ORAL at 21:01

## 2017-09-02 RX ADMIN — SODIUM CHLORIDE 8 MG/HR: 9 INJECTION, SOLUTION INTRAVENOUS at 03:23

## 2017-09-02 RX ADMIN — SODIUM CHLORIDE, POTASSIUM CHLORIDE, SODIUM LACTATE AND CALCIUM CHLORIDE: 600; 310; 30; 20 INJECTION, SOLUTION INTRAVENOUS at 14:37

## 2017-09-02 RX ADMIN — LATANOPROST 1 DROP: 50 SOLUTION/ DROPS OPHTHALMIC at 21:01

## 2017-09-02 RX ADMIN — LIDOCAINE HYDROCHLORIDE 100 MG: 10 INJECTION, SOLUTION EPIDURAL; INFILTRATION; INTRACAUDAL; PERINEURAL at 11:49

## 2017-09-02 RX ADMIN — PANTOPRAZOLE SODIUM 40 MG: 40 INJECTION, POWDER, FOR SOLUTION INTRAVENOUS at 19:29

## 2017-09-02 RX ADMIN — PROPOFOL 200 MCG/KG/MIN: 10 INJECTION, EMULSION INTRAVENOUS at 11:51

## 2017-09-02 RX ADMIN — ACETAMINOPHEN 650 MG: 325 TABLET, FILM COATED ORAL at 13:17

## 2017-09-02 RX ADMIN — SODIUM CHLORIDE, POTASSIUM CHLORIDE, SODIUM LACTATE AND CALCIUM CHLORIDE: 600; 310; 30; 20 INJECTION, SOLUTION INTRAVENOUS at 04:58

## 2017-09-02 RX ADMIN — GLYCOPYRROLATE 0.2 MG: 0.2 INJECTION, SOLUTION INTRAMUSCULAR; INTRAVENOUS at 11:49

## 2017-09-02 ASSESSMENT — LIFESTYLE VARIABLES: TOBACCO_USE: 1

## 2017-09-02 NOTE — PROGRESS NOTES
EGD today revealed retained food products, severe esophagitis and gastritis with multiple small antral ulcers.  Will leave NGT out for now, but may need to replace if he vomits.  He needs to avoid all NSAIDs and start BID PPIs.  The pylorus admitted the endoscope without difficulty, so he should be able to at least tolerate a liquid diet.    Matthew Gonzales MD FACS

## 2017-09-02 NOTE — ANESTHESIA CARE TRANSFER NOTE
Patient: Diogo FERRER Tetraernie    Procedure(s):   - Wound Class: II-Clean Contaminated    Diagnosis: abdominal pain  Diagnosis Additional Information: No value filed.    Anesthesia Type:   No value filed.     Note:  Airway :Room Air  Patient transferred to:Phase II        Vitals: (Last set prior to Anesthesia Care Transfer)    CRNA VITALS  9/2/2017 1138 - 9/2/2017 1208      9/2/2017             NIBP: 107/79    Pulse: 75    NIBP Mean: 95    SpO2: 97 %                Electronically Signed By: RAZIA Velázquez CRNA  September 2, 2017  12:08 PM

## 2017-09-02 NOTE — H&P
"CHIEF COMPLAINT:  Vomiting for 24 hours.      HISTORY OF PRESENT ILLNESS:  Diogo Wilburn is a 67-year-old male with a history of hyperlipidemia.  He uses a lot of ibuprofen for back pain.  He takes an aspirin a day.  He says he drinks two alcoholic drinks per day.  He has had a lot of heartburn, some epigastric pain, and what he calls \"reflux\" in the last two or three months.  In the last week, he says, he has been alternating between diarrhea and constipation.  He has tried Dulcolax and MiraLax.  Twenty-four hours prior to admission, he had some chills, he started vomiting, and he had upper abdominal pain.  He had some undigested food in his vomit.  No blood in his vomit.  He has been unable to eat or drink today.      He was evaluated in the emergency room.  His white count was normal.  He was afebrile.  His creatinine was 1.35; I am not sure what his baseline is.  His calcium was slightly elevated at 11.5.  He had normal liver enzyme values.      He had a plain film of his abdomen done that was unremarkable.      An abdominal ultrasound was done which showed no cholelithiasis or cholecystitis.  He had possible hepatomegaly.  He had a fluid and debris-filled structure anteriorly, probably the stomach, based on the appearance on plain film.  Gastric outlet obstruction was queried.  CT scan was suggested.      The patient is going to proceed with CT of his abdomen.  An NG is being placed.  He is going to be given Protonix, and continued on a Protonix drip.      PAST MEDICAL HISTORY:   1.  Chronic back pain.   2.  History of glucose intolerance.   3.  Hyperlipidemia.      PAST SURGICAL HISTORY:   1.  Bilateral carpal tunnel release.   2.  Vasectomy.   3.  Umbilical hernia repair.   4.  Colonoscopy.      FAMILY HISTORY:  His mother had hypertension.  His father had heart disease.  Maternal grandfather with heart disease.      HABITS:  He quit smoking in 1984.  He reports two drinks per day of alcohol.    "   ALLERGIES:  No known allergies.      SOCIAL HISTORY:  He lives locally.  He goes to the [facility].      MEDICATIONS:   1.  MiraLax 1 capful daily.   2.  Xalatan eyedrops 0.005% 1 drop both eyes at bedtime.   3.  Simvastatin 20 mg at bedtime.   4.  Fenofibrate 145 mg daily.   5.  Aspirin 81 mg daily.      REVIEW OF SYSTEMS:  He has had some chills, no documented fever.  No headache.  No chest pain.  No shortness of breath.  He has had recent heartburn-type symptoms.  He has had recent epigastric discomfort and vomiting.  He has had some alternating constipation with loose stools recently.  He has had no blood in his vomit or stool.  No urinary tract symptoms.  No skin symptoms.  No neurological symptoms.  The rest of the 10-point review of systems is negative.      PHYSICAL EXAMINATION:   GENERAL:  He is alert, pleasant.   VITAL SIGNS:  Temperature 98.2, heart rate 62, blood pressure 174/109 (this is the highest value he has had so far), respiratory rate 18, sats 96% on room air.   HEENT:  Ears negative.  Oral negative.   NECK:  Supple.   LUNGS:  He had just a few bibasilar crackles.   COR:  S1, S2, without click, rub or murmur.   ABDOMEN:  Obese.  Some vague epigastric tenderness.  There is no guarding, rebound, rigidity or mass.  Bowel sounds were normal.   GENITALIA:  Grossly normal.   EXTREMITIES:  Negative.  No edema.  Radha sign negative.   SKIN:  Negative.      LABORATORY DATA:  Imaging as above.  CT is pending.      His lipase was 442.  His creatinine was 1.35.  Glucose 117.  CBC was normal.  Calcium was 11.5.      ASSESSMENT:   1.  Persistent vomiting and epigastric discomfort, rule out gastric outlet obstruction.   2.  Recent GERD-type symptoms.   3.  Mildly elevated creatinine, unclear chronicity.   4.  Moderately elevated calcium, unclear etiology.   5.  History of aspirin, NSAID, and alcohol use.   6.  Very mildly elevated lipase.      PLAN:   1.  He is going to proceed with CT of the abdomen.    2.  There is some concern for gastric outlet obstruction.  An NG is being placed.   3.  Tentatively, he will probably need an EGD.  Dr. Siddiqi is going to talk to Dr. Gonzales about that, and I am ordering that for tomorrow.   4.  We will give him fluids.   5.  We will need to follow his creatinine.   6.  We will need to see what his calcium is tomorrow.   7.  I will order a parathyroid hormone for tomorrow.   8.  Prophylaxis will be with sequential compression devices.   9.  I am holding his oral medications at this time.   10.  He has no history of withdrawal or alcohol-related problems.  I did not put him on CIWA at this time, but we will watch him closely.   11.  Pancreatitis would be another possibility.  His lipase, however, is just slightly elevated.  Again, we will see what the CT scan shows, and base further testing on that.         MELINDA ACOSTA MD             D: 2017 21:21   T: 2017 01:04   MT: VANDANA#[id number]      Name:     JASIEL FERRARO   MRN:      [id number]        Account:      [id number]   :      1950           Admitted:     [id number]      Document: [id number]       cc: [facility]

## 2017-09-02 NOTE — PLAN OF CARE
Problem: Goal Outcome Summary  Goal: Goal Outcome Summary  Outcome: Improving  Patient denies any nausea, vomiting or pain. Patient voiding, using urinal. NG tube in place and set to low intermittent suction, pulling out brown fluid, see output for amount. IV infusing and Protonix drip infusing. NPO after midnight. EGD information in room.

## 2017-09-02 NOTE — PLAN OF CARE
Problem: Goal Outcome Summary  Goal: Goal Outcome Summary  VSS, no further nausea, no ABD discomfort.  Pt states that he does feel much improved after large, loose BM this morning and has been passing large amounts of flatus.  NG to LIS with 150 cc green contents out so far this morning.  EGD today.

## 2017-09-02 NOTE — PROGRESS NOTES
Report given to Ella ALVAREZ R.N. In Med-Surg. Pt. Is awake and alert. Taking in ice-chips and codie. Well. Denies nausea/pain.

## 2017-09-02 NOTE — PROGRESS NOTES
[facility] Hospitalist Progress Note           Assessment and Plan:     Persistent vomiting and epigastric discomfort, rule out gastric outlet obstruction with recent GERD-type symptoms - suspect due to partial gastric outlet obstruction due to severe gastritis as seen on EGD  9/1/17 -- There is some concern for gastric outlet obstruction.  An NG is being placed. EGD tomorrow  9/2/2017 -- appears to be due to narrowing but not complete obstruction of gastric outlet due to severe gastritis in turn due to extensive NSAID use and daily alcohol use.  Recommend completely stopping alcohol and NSAIDS for now, including stopping daily aspirin.  Transition to twice daily protonix IV from drip, plan to continue twice daily oral dosing on discharge.  Use tylenol for chronic back pain - would recommend avoiding narcotics for this if able, but would prefer these over ongoing  NSAID use in this case at least in the short term.  Continue on liquids tonight, advance to soft diet in AM if doing well.     Mildly elevated creatinine, unclear chronicity.   9/2/2017 -- faintly up - follow.      Moderately elevated calcium, unclear etiology.   9/2/2017 -- resolved.    Very mildly elevated lipase.   9/2/2017 -- normalized overnight, doubt pancreatitis.        Daily Alcohol use  9/1/17 -- 2 drinks per day. He has no history of withdrawal or alcohol-related problems.  I did not put him on CIWA at this time, but we will watch him closely.   9/2/2017 -- doing well, no symptoms of withdrawal.  Follow.      Prophylaxis  Prophylaxis will be with sequential compression devices.    Lines  PIV    Disposition  Improving, hope for discharge tomorrow afternoon if tolerates advanced diet but may need slower advancement.              Interval History:   Much improved - pain essentially gone, no nausea or vomiting or feeling of distention of abdomen.  Just starting on liquids, no fever or chills.  No new concerns.            Review of Systems:     "ROS: 10 point ROS neg other than the symptoms noted above in the HPI.             Medications:   Current active medications and PTA medications reviewed, see medication list for details.            Physical Exam:   Vitals were reviewed  Patient Vitals for the past 24 hrs:   BP Temp Temp src Pulse Heart Rate Resp SpO2 Height Weight   17 1309 141/74 - - 62 - 16 97 % - -   17 1245 131/81 - - - 58 16 98 % - -   17 1230 142/85 - - - 56 16 96 % - -   17 1214 125/90 98.6  F (37  C) Oral - 63 16 96 % - -   17 1111 134/74 98.6  F (37  C) Oral - - 16 97 % - -   17 0731 133/72 98.5  F (36.9  C) Oral 52 - 16 94 % - -   17 0500 138/72 99.2  F (37.3  C) Oral - - 16 97 % - -   17 2357 163/89 98.9  F (37.2  C) Oral - 61 18 95 % - -   17 2348 - - - - - - - 1.778 m (5' 10\") 97 kg (213 lb 13.5 oz)   17 2315 142/77 - - - - - 93 % - -   17 2300 (!) 157/93 - - - - - 92 % - -   17 2245 157/88 - - - - - 97 % - -   17 2230 143/88 - - - - - 98 % - -   17 2228 - - - - - - 96 % - -   17 2218 139/81 - - - - - - - -   17 2100 154/86 - - - - - 96 % - -   17 2045 (!) 174/109 - - - - - 96 % - -   17 (!) 182/93 - - - - - 98 % - -   17 164/90 - - - - - 97 % - -   17 (!) 186/92 - - - - - 94 % - -   17 194 (!) 176/ - - - - - 94 % - -   17 193 176/86 - - - 62 18 98 % - -   17 193 176/86 - - - - - - - -   17 182 - - - - - - 94 % - -   17 182 (!) 171/ - - - - - - - -   17 171 - - - - - - 98 % - -   17 1716 (!) 181 - - - - - - - -   17 1630 163/89 - - - - - - - -   17 1555 153/83 98.2  F (36.8  C) Oral - 61 15 98 % - -       Temperatures:  Current - Temp: 98.6  F (37  C); Max - Temp  Av.7  F (37.1  C)  Min: 98.2  F (36.8  C)  Max: 99.2  F (37.3  C)  Respiration range: Resp  Av.3  Min: 15  Max: 18  Pulse range: Pulse  Av  Min: 52  Max: " 62  Blood pressure range: Systolic (24hrs), Av , Min:125 , Max:186   ; Diastolic (24hrs), Av, Min:72, Max:109    Pulse oximetry range: SpO2  Av %  Min: 92 %  Max: 98 %  I/O last 3 completed shifts:  In: 3099.58 [P.O.:460; I.V.:2639.58]  Out: 4875 [Urine:1600; Emesis/NG output:3275]    Intake/Output Summary (Last 24 hours) at 17 1507  Last data filed at 17 1434   Gross per 24 hour   Intake          3099.58 ml   Output             4875 ml   Net         -1775.42 ml     EXAM:  General: awake and alert, NAD, oriented x 3  Head: normocephalic  Neck: unremarkable, no lymphadenopathy   HEENT: oropharynx pink and moist    Heart: Regular rate and rhythm, no murmurs, rubs, or gallops  Lungs: clear to auscultation bilaterally with good air movement throughout  Abdomen: soft, non-tender, no masses or organomegaly, bowel sounds present throughout   Extremities: no edema in lower extremities   Skin unremarkable.               Data:     Results for orders placed or performed during the hospital encounter of 17 (from the past 24 hour(s))   CBC with platelets, differential   Result Value Ref Range    WBC 9.9 4.0 - 11.0 10e9/L    RBC Count 5.22 4.4 - 5.9 10e12/L    Hemoglobin 15.6 13.3 - 17.7 g/dL    Hematocrit 46.2 40.0 - 53.0 %    MCV 89 78 - 100 fl    MCH 29.9 26.5 - 33.0 pg    MCHC 33.8 31.5 - 36.5 g/dL    RDW 12.6 10.0 - 15.0 %    Platelet Count 336 150 - 450 10e9/L    Diff Method Automated Method     % Neutrophils 81.3 %    % Lymphocytes 13.5 %    % Monocytes 4.6 %    % Eosinophils 0.1 %    % Basophils 0.3 %    % Immature Granulocytes 0.2 %    Absolute Neutrophil 8.1 1.6 - 8.3 10e9/L    Absolute Lymphocytes 1.3 0.8 - 5.3 10e9/L    Absolute Monocytes 0.5 0.0 - 1.3 10e9/L    Absolute Eosinophils 0.0 0.0 - 0.7 10e9/L    Absolute Basophils 0.0 0.0 - 0.2 10e9/L    Abs Immature Granulocytes 0.0 0 - 0.4 10e9/L   Comprehensive metabolic panel   Result Value Ref Range    Sodium 140 133 - 144 mmol/L     Potassium 4.2 3.4 - 5.3 mmol/L    Chloride 101 94 - 109 mmol/L    Carbon Dioxide 32 20 - 32 mmol/L    Anion Gap 7 3 - 14 mmol/L    Glucose 117 (H) 70 - 99 mg/dL    Urea Nitrogen 22 7 - 30 mg/dL    Creatinine 1.35 (H) 0.66 - 1.25 mg/dL    GFR Estimate 53 (L) >60 mL/min/1.7m2    GFR Estimate If Black 64 >60 mL/min/1.7m2    Calcium 11.5 (H) 8.5 - 10.1 mg/dL    Bilirubin Total 0.8 0.2 - 1.3 mg/dL    Albumin 4.6 3.4 - 5.0 g/dL    Protein Total 8.5 6.8 - 8.8 g/dL    Alkaline Phosphatase 54 40 - 150 U/L    ALT 27 0 - 70 U/L    AST 19 0 - 45 U/L   Lipase   Result Value Ref Range    Lipase 442 (H) 73 - 393 U/L   Amylase   Result Value Ref Range    Amylase 69 30 - 110 U/L   GGT   Result Value Ref Range    GGT 14 0 - 75 U/L   XR Abdomen 2 Views    Narrative    XR ABDOMEN 2 VW   9/1/2017 5:29 PM      HISTORY:  vomiting, constipation    COMPARISON: None.    FINDINGS: The gas pattern is normal. No free air. There are radiopaque  densities projected over the right lower quadrant. These are probably  within the GI tract and are probably secondary to enteric tablets. The  stomach appears to be distended.      Impression    IMPRESSION:  No free air. No obstruction identified.. Stomach appears  distended.    ADRYAN YEUNG MD   US Abdomen Limited    Narrative    ULTRASOUND ABDOMEN LIMITED  9/1/2017 7:21 PM     HISTORY: Right upper quadrant pain    COMPARISON: None.    FINDINGS: Liver is normal in echogenicity without focal lesions. It  may be enlarged measuring 23 cm in craniocaudal dimension. Gallbladder  is normal without stones or sludge. Extrahepatic bile duct is normal  in diameter. Pancreas is normal where visualized. Right kidney is  normal in size. There is no hydronephrosis. Prominent cystic structure  noted anteriorly, this could be a fluid and debris-filled stomach.      Impression    IMPRESSION:    1. No cholelithiasis or cholecystitis.  2. Possible hepatomegaly.  3. Fluid and debris-filled structure anteriorly, probably the  stomach  given its appearance on plain film. Question gastric outlet  obstruction. Consider CT scan for further evaluation.    MELINDA TSANG MD   CT Abdomen Pelvis w Contrast    Narrative    CT ABDOMEN AND PELVIS WITH CONTRAST 9/1/2017 10:15 PM     HISTORY: Suspected gastric outlet obstruction with recurrent vomiting    COMPARISON: Ultrasound and plain radiography same day.    TECHNIQUE: Volumetric helical acquisition of CT images from the lung  bases through the symphysis pubis after the administration of 100 mL  Isovue-370  intravenous contrast. Radiation dose for this scan was  reduced using automated exposure control, adjustment of the mA and/or  kV according to patient size, or iterative reconstruction technique.    FINDINGS: There is inflammation in the region of the gastric antrum  and first portion of duodenum, oral contrast does pass through this  region. No bowel obstruction. No hydronephrosis. Fatty changes of  liver. The liver, spleen, adrenal glands, kidneys, and pancreas  demonstrate no worrisome focal lesion. There are moderate  atherosclerotic changes of the visualized aorta and its branches.  There is no evidence of aortic dissection or aneurysm. No free fluid.  No free air. There are no lymph nodes that are abnormal by size  criteria. The visualized lung bases are unremarkable. Survey of the  visualized bony structures demonstrates no destructive bony lesions.      Impression    IMPRESSION: There is no gastric outlet obstruction, but there is  inflammatory change in the region the antrum and first portion of the  duodenum. Peptic ulcer disease may be present. The gastric distention  seen previously may have been related to gastric ileus.    MELINDA TSANG MD   Comprehensive metabolic panel   Result Value Ref Range    Sodium 144 133 - 144 mmol/L    Potassium 4.0 3.4 - 5.3 mmol/L    Chloride 108 94 - 109 mmol/L    Carbon Dioxide 28 20 - 32 mmol/L    Anion Gap 8 3 - 14 mmol/L    Glucose 108 (H) 70 - 99  mg/dL    Urea Nitrogen 22 7 - 30 mg/dL    Creatinine 1.46 (H) 0.66 - 1.25 mg/dL    GFR Estimate 48 (L) >60 mL/min/1.7m2    GFR Estimate If Black 59 (L) >60 mL/min/1.7m2    Calcium 9.7 8.5 - 10.1 mg/dL    Bilirubin Total 0.8 0.2 - 1.3 mg/dL    Albumin 3.7 3.4 - 5.0 g/dL    Protein Total 6.9 6.8 - 8.8 g/dL    Alkaline Phosphatase 42 40 - 150 U/L    ALT 20 0 - 70 U/L    AST 13 0 - 45 U/L   CBC with platelets   Result Value Ref Range    WBC 8.9 4.0 - 11.0 10e9/L    RBC Count 4.58 4.4 - 5.9 10e12/L    Hemoglobin 13.7 13.3 - 17.7 g/dL    Hematocrit 41.2 40.0 - 53.0 %    MCV 90 78 - 100 fl    MCH 29.9 26.5 - 33.0 pg    MCHC 33.3 31.5 - 36.5 g/dL    RDW 12.8 10.0 - 15.0 %    Platelet Count 273 150 - 450 10e9/L   Parathyroid Hormone Intact   Result Value Ref Range    Parathyroid Hormone Intact 33 12 - 72 pg/mL   INR   Result Value Ref Range    INR 1.01 0.86 - 1.14   Lipase   Result Value Ref Range    Lipase 219 73 - 393 U/L   UPPER GI ENDOSCOPY   Result Value Ref Range    Upper GI Endoscopy       _______________________________________________________________________________  Patient Name: Diogo Wilburn         Procedure Date: 9/2/2017 11:32 AM  MRN: [id number]                       YOB: 1950  Admit Type: Outpatient                Age: 67  Gender: Male                          Attending MD: Matthew Gonzales MD  Total Sedation Time:                    _______________________________________________________________________________     Procedure:           Upper GI endoscopy  Indications:         Persistent vomiting of unknown cause, Vomiting  Providers:           Matthew Gonzales MD, Skye Bourne RN  Referring MD:          Medicines:           Monitored Anesthesia Care  Complications:       No immediate complications.  _______________________________________________________________________________  Procedure:           Pre-Anesthesia Assessment:                       - Prior to the procedure, a History and Physical  was                        performed, and patient medi cations, allergies and                        sensitivities were reviewed. The patient's tolerance of                        previous anesthesia was reviewed.                       - The risks and benefits of the procedure and the                        sedation options and risks were discussed with the                        patient. All questions were answered and informed                        consent was obtained.                       - Patient identification and proposed procedure were                        verified prior to the procedure by the physician, the                        nurse, the anesthetist and the technician. The procedure                        was verified in the pre-procedure area in the procedure                        room in the endoscopy suite.                       After obtaining informed consent, the endoscope was                        passed under direct vision. Throughout the procedure,                        the patient's blood pressure, pulse, and  oxygen                        saturations were monitored continuously. The Endoscope                        was introduced through the mouth, and advanced to the                        second part of duodenum. The upper GI endoscopy was                        accomplished without difficulty. The patient tolerated                        the procedure well.                                                                                   Findings:       The oropharynx was normal.       LA Grade D (one or more mucosal breaks involving at least 75% of        esophageal circumference) esophagitis with no bleeding was found 39 to        43 cm from the incisors.       The exam of the esophagus was otherwise normal.       Diffuse severe inflammation characterized by congestion (edema),        erosions, friability, granularity and shallow ulcerations was found in        the entire  examined stomach. Biopsies were taken with a cold forceps for        Helicobacter pylori testing. Verification of  patient identification for        the specimen was done by the physician, nurse and technician using the        patient's name, birth date and medical record number. Estimated blood        loss was minimal. The pyloric channel was narrowed, but easily admitted        the endoscope. Could not see any obvious ulcers.       The examined duodenum was normal.                                                                                   Impression:          - Normal oropharynx.                       - LA Grade D reflux esophagitis.                       - Gastritis. Biopsied.                       - Normal examined duodenum.  Recommendation:      - [facility].                       - Await pathology results.                                                                                     _______________  Matthew Gonzales MD  9/2/2017 12:11:12 PM  Number of Addenda: 0    Note Initiated On: 9/2/2017 11:32 AM  Scope In:  Scope Out:             Attestation:  I have reviewed today's vital signs, notes, medications, labs and imaging.  Amount of time performed on this daily note: 30 minutes.     Dillan Troy MD, MD

## 2017-09-02 NOTE — ANESTHESIA PREPROCEDURE EVALUATION
Anesthesia Evaluation     . Pt has had prior anesthetic. Type: General    No history of anesthetic complications          ROS/MED HX    ENT/Pulmonary:     (+)tobacco use, Past use , . .    Neurologic:  - neg neurologic ROS     Cardiovascular:  - neg cardiovascular ROS       METS/Exercise Tolerance:  >4 METS   Hematologic:  - neg hematologic  ROS       Musculoskeletal: Comment: Back pain - neg musculoskeletal ROS       GI/Hepatic: Comment: Gastric inflammation per CT, possible ulcer        Renal/Genitourinary: Comment: Creatinine 1.46, ibuprofen use        Endo:  - neg endo ROS       Psychiatric:  - neg psychiatric ROS       Infectious Disease:  - neg infectious disease ROS       Malignancy:      - no malignancy   Other:    - neg other ROS                 Physical Exam  Normal systems: cardiovascular, pulmonary and dental    Airway   Mallampati: II  TM distance: >3 FB  Neck ROM: full    Dental     Cardiovascular       Pulmonary                     Anesthesia Plan      History & Physical Review      ASA Status:  2 .    NPO Status:  > 8 hours (NG in place over night)    Plan for MAC Reason for MAC:  Deep or markedly invasive procedure (G8)         Postoperative Care      Consents  Anesthetic plan, risks, benefits and alternatives discussed with:  Patient..                          .

## 2017-09-02 NOTE — ANESTHESIA POSTPROCEDURE EVALUATION
Patient: Diogo Wilburn    Procedure(s):   - Wound Class: II-Clean Contaminated    Diagnosis:abdominal pain  Diagnosis Additional Information: No value filed.    Anesthesia Type:  No value filed.    Note:  Anesthesia Post Evaluation    Patient location during evaluation: Bedside  Patient participation: Able to fully participate in evaluation  Level of consciousness: awake and alert  Pain management: adequate  Airway patency: patent  Cardiovascular status: acceptable  Respiratory status: acceptable  Hydration status: acceptable  PONV: none     Anesthetic complications: None          Last vitals:  Vitals:    09/02/17 0500 09/02/17 0731 09/02/17 1111   BP: 138/72 133/72 134/74   Pulse:  52    Resp: 16 16 16   Temp: 37.3  C (99.2  F) 36.9  C (98.5  F) 37  C (98.6  F)   SpO2: 97% 94% 97%         Electronically Signed By: RAZIA Velázquez CRNA  September 2, 2017  12:08 PM

## 2017-09-02 NOTE — BRIEF OP NOTE
[facility]   Brief Operative Note    Pre-operative diagnosis: abdominal pain   Post-operative diagnosis gastritis with multiple antral ulcers. severe esophagitis due to GERD   Procedure: Procedure(s):   - Wound Class: II-Clean Contaminated   Surgeon(s): Surgeon(s) and Role:     * Matthew Gonzales MD - Primary   Estimated blood loss: * No values recorded between 9/2/2017 12:00 AM and 9/2/2017 11:59 AM *    Specimens:   ID Type Source Tests Collected by Time Destination   A : biopsies from antrum for h pylori Tissue Stomach, Antrum SURGICAL PATHOLOGY EXAM Matthew Gonzales MD 9/2/2017 11:54 AM       Findings: 1. Severe ulcerative esophagitis (likely due to reflux and vomiting  2. Severe gastritis with multiple antral ulcers.    3. Narrowed pyloric channel, but scope passed easily into duodenum  4. Fair amount of retained food material  5. Duodenum normal

## 2017-09-02 NOTE — PROGRESS NOTES
[place] [facility] [place]    Patient admitted to room 2404 at approximately 0000 via cart from emergency room. Patient was accompanied by spouse and transport tech.     Verbal SBAR report received from [facility] prior to patient arrival.     Patient ambulated to bed with stand-by assist. Patient alert and oriented X 3. The patient is not having any pain. 0-10 Pain Scale: 5. Admission vital signs: Blood pressure 163/89, temperature 98.9  F (37.2  C), temperature source Oral, resp. rate 18, height 1.778 m (5' 10\"), weight 97 kg (213 lb 13.5 oz), SpO2 95 %. Patient was oriented to plan of care, call light, bed controls, tv, telephone, bathroom and visiting hours.     The following safety risks were identified during admission: none. Yellow risk band applied: KATJA Card    "

## 2017-09-03 VITALS
SYSTOLIC BLOOD PRESSURE: 109 MMHG | HEIGHT: 70 IN | HEART RATE: 50 BPM | RESPIRATION RATE: 18 BRPM | TEMPERATURE: 98.1 F | DIASTOLIC BLOOD PRESSURE: 63 MMHG | OXYGEN SATURATION: 96 % | WEIGHT: 218.26 LBS | BODY MASS INDEX: 31.25 KG/M2

## 2017-09-03 LAB
ALBUMIN SERPL-MCNC: 3.3 G/DL (ref 3.4–5)
ALP SERPL-CCNC: 41 U/L (ref 40–150)
ALT SERPL W P-5'-P-CCNC: 19 U/L (ref 0–70)
ANION GAP SERPL CALCULATED.3IONS-SCNC: 5 MMOL/L (ref 3–14)
AST SERPL W P-5'-P-CCNC: 13 U/L (ref 0–45)
BASOPHILS # BLD AUTO: 0 10E9/L (ref 0–0.2)
BASOPHILS NFR BLD AUTO: 0.7 %
BILIRUB SERPL-MCNC: 0.7 MG/DL (ref 0.2–1.3)
BUN SERPL-MCNC: 20 MG/DL (ref 7–30)
CALCIUM SERPL-MCNC: 9.1 MG/DL (ref 8.5–10.1)
CHLORIDE SERPL-SCNC: 107 MMOL/L (ref 94–109)
CO2 SERPL-SCNC: 28 MMOL/L (ref 20–32)
CREAT SERPL-MCNC: 1.34 MG/DL (ref 0.66–1.25)
DIFFERENTIAL METHOD BLD: ABNORMAL
EOSINOPHIL # BLD AUTO: 0.2 10E9/L (ref 0–0.7)
EOSINOPHIL NFR BLD AUTO: 2.9 %
ERYTHROCYTE [DISTWIDTH] IN BLOOD BY AUTOMATED COUNT: 12.8 % (ref 10–15)
GFR SERPL CREATININE-BSD FRML MDRD: 53 ML/MIN/1.7M2
GLUCOSE SERPL-MCNC: 101 MG/DL (ref 70–99)
HCT VFR BLD AUTO: 38.2 % (ref 40–53)
HGB BLD-MCNC: 12.3 G/DL (ref 13.3–17.7)
HGB BLD-MCNC: 12.7 G/DL (ref 13.3–17.7)
IMM GRANULOCYTES # BLD: 0 10E9/L (ref 0–0.4)
IMM GRANULOCYTES NFR BLD: 0.2 %
LYMPHOCYTES # BLD AUTO: 1.3 10E9/L (ref 0.8–5.3)
LYMPHOCYTES NFR BLD AUTO: 23.3 %
MCH RBC QN AUTO: 29.8 PG (ref 26.5–33)
MCHC RBC AUTO-ENTMCNC: 32.2 G/DL (ref 31.5–36.5)
MCV RBC AUTO: 93 FL (ref 78–100)
MONOCYTES # BLD AUTO: 0.5 10E9/L (ref 0–1.3)
MONOCYTES NFR BLD AUTO: 8.4 %
NEUTROPHILS # BLD AUTO: 3.5 10E9/L (ref 1.6–8.3)
NEUTROPHILS NFR BLD AUTO: 64.5 %
PLATELET # BLD AUTO: 225 10E9/L (ref 150–450)
POTASSIUM SERPL-SCNC: 4.2 MMOL/L (ref 3.4–5.3)
PROT SERPL-MCNC: 6.3 G/DL (ref 6.8–8.8)
RBC # BLD AUTO: 4.13 10E12/L (ref 4.4–5.9)
SODIUM SERPL-SCNC: 140 MMOL/L (ref 133–144)
WBC # BLD AUTO: 5.5 10E9/L (ref 4–11)

## 2017-09-03 PROCEDURE — 25000125 ZZHC RX 250: Performed by: FAMILY MEDICINE

## 2017-09-03 PROCEDURE — 85018 HEMOGLOBIN: CPT | Performed by: FAMILY MEDICINE

## 2017-09-03 PROCEDURE — 80053 COMPREHEN METABOLIC PANEL: CPT | Performed by: FAMILY MEDICINE

## 2017-09-03 PROCEDURE — 36415 COLL VENOUS BLD VENIPUNCTURE: CPT | Performed by: FAMILY MEDICINE

## 2017-09-03 PROCEDURE — 85025 COMPLETE CBC W/AUTO DIFF WBC: CPT | Performed by: FAMILY MEDICINE

## 2017-09-03 PROCEDURE — 99239 HOSP IP/OBS DSCHRG MGMT >30: CPT | Performed by: FAMILY MEDICINE

## 2017-09-03 RX ORDER — HYDROCODONE BITARTRATE AND ACETAMINOPHEN 5; 325 MG/1; MG/1
1 TABLET ORAL EVERY 4 HOURS PRN
Qty: 10 TABLET | Refills: 0 | Status: SHIPPED | OUTPATIENT
Start: 2017-09-03

## 2017-09-03 RX ADMIN — PANTOPRAZOLE SODIUM 40 MG: 40 INJECTION, POWDER, FOR SOLUTION INTRAVENOUS at 08:18

## 2017-09-03 NOTE — PLAN OF CARE
Problem: Goal Outcome Summary  Goal: Goal Outcome Summary  Outcome: Improving  Patient VSS, CPAP during night. IV remains infusing, drinking water during night. Voiding well. No complaints of pain, no nausea. BS audible and normoactive. Up independently in room.

## 2017-09-03 NOTE — DISCHARGE INSTRUCTIONS
Follow-up with your primary care provider in about 1 week.  Have them recheck your hemoglobin and discuss how long to continue the twice daily omeprazole vs decreasing to once daily at that time.

## 2017-09-03 NOTE — DISCHARGE SUMMARY
[facility] Discharge Summary    Diogo iWlburn MRN# [id number]   Age: 67 year old YOB: 1950     Date of Admission:  9/1/2017  Date of Discharge::  9/3/2017  Admitting Physician:  Cleveland Hollingsworth MD  Discharge Physician:  Dillan Troy MD, MD             Admission Diagnoses:   Gastric outlet obstruction [K31.1]  Intractable vomiting with nausea, unspecified vomiting type [R11.2]          Principle Discharge Diagnosis:     Persistent vomiting and epigastric discomfort, rule out gastric outlet obstruction with recent GERD-type symptoms - suspect due to partial gastric outlet obstruction due to severe gastritis as seen on EGD    See hospital course for further active diagnoses addressed during this admission.            Procedures:   No procedures performed during this admission          Medications Prior to Admission:     Prescriptions Prior to Admission   Medication Sig Dispense Refill Last Dose     polyethylene glycol (MIRALAX/GLYCOLAX) powder Take 1 capful by mouth daily   Past Week at Unknown time     latanoprost (XALATAN) 0.005 % ophthalmic solution Place 1 drop into both eyes At Bedtime   8/31/2017 at pm     SIMVASTATIN PO Take 20 mg by mouth At Bedtime   8/31/2017 at pm     FENOFIBRATE PO Take 145 mg by mouth daily   9/1/2017 at am     ASPIRIN 81 MG OR TABS ONE DAILY 100 3 8/31/2017 at pm             Discharge Medications:     Current Discharge Medication List      START taking these medications    Details   omeprazole (PRILOSEC) 20 MG CR capsule Take 1 capsule (20 mg) by mouth 2 times daily  Qty: 60 capsule, Refills: 1    Associated Diagnoses: Acute gastritis without hemorrhage, unspecified gastritis type      HYDROcodone-acetaminophen (NORCO) 5-325 MG per tablet Take 1 tablet by mouth every 4 hours as needed for pain maximum 4 tablet(s) per day  Qty: 10 tablet, Refills: 0    Associated Diagnoses: Acute gastritis without hemorrhage, unspecified gastritis type         CONTINUE  "these medications which have NOT CHANGED    Details   polyethylene glycol (MIRALAX/GLYCOLAX) powder Take 1 capful by mouth daily      latanoprost (XALATAN) 0.005 % ophthalmic solution Place 1 drop into both eyes At Bedtime      SIMVASTATIN PO Take 20 mg by mouth At Bedtime      FENOFIBRATE PO Take 145 mg by mouth daily      ASPIRIN 81 MG OR TABS ONE DAILY  Qty: 100, Refills: 3    Associated Diagnoses: Unspecified disorder of lipoid metabolism                   Consultations:   Consultation during this admission received from surgery          Brief History of Illness:     From Admission H+P:   Diogo Wilburn is a 67-year-old male with a history of hyperlipidemia.  He uses a lot of ibuprofen for back pain.  He takes an aspirin a day.  He says he drinks two alcoholic drinks per day.  He has had a lot of heartburn, some epigastric pain, and what he calls \"reflux\" in the last two or three months.  In the last week, he says, he has been alternating between diarrhea and constipation.  He has tried Dulcolax and MiraLax.  Twenty-four hours prior to admission, he had some chills, he started vomiting, and he had upper abdominal pain.  He had some undigested food in his vomit.  No blood in his vomit.  He has been unable to eat or drink today.       He was evaluated in the emergency room.  His white count was normal.  He was afebrile.  His creatinine was 1.35; I am not sure what his baseline is.  His calcium was slightly elevated at 11.5.  He had normal liver enzyme values.       He had a plain film of his abdomen done that was unremarkable.       An abdominal ultrasound was done which showed no cholelithiasis or cholecystitis.  He had possible hepatomegaly.  He had a fluid and debris-filled structure anteriorly, probably the stomach, based on the appearance on plain film.  Gastric outlet obstruction was queried.  CT scan was suggested.       The patient is going to proceed with CT of his abdomen.  An NG is being placed.  He is " "going to be given Protonix, and continued on a Protonix drip.                 TODAY:     Subjective:  Doing well, tolerated advancing diet well including normal diet for lunch without any pain.  No fever or chills.  No pain.  No nausea or vomiting.  No other concerns.     ROS:   ROS: 10 point ROS neg other than the symptoms noted above in the HPI.     /63 (BP Location: Left arm)  Pulse 50  Temp 98.1  F (36.7  C) (Oral)  Resp 18  Ht 1.778 m (5' 10\")  Wt 99 kg (218 lb 4.1 oz)  SpO2 96%  BMI 31.32 kg/m2   EXAM:  General: awake and alert, NAD, oriented x 3  Head: normocephalic  Neck: unremarkable, no lymphadenopathy   HEENT: oropharynx pink and moist    Heart: Regular rate and rhythm, no murmurs, rubs, or gallops  Lungs: clear to auscultation bilaterally with good air movement throughout  Abdomen: soft, non-tender, no masses or organomegaly, non-distended, normal bowel sounds   Extremities: no edema in lower extremities   Skin unremarkable.            Hospital Course:     Persistent vomiting and epigastric discomfort, rule out gastric outlet obstruction with recent GERD-type symptoms - suspect due to partial gastric outlet obstruction due to severe gastritis as seen on EGD  9/1/17 -- There is some concern for gastric outlet obstruction.  An NG is being placed. EGD tomorrow  9/2/2017 -- appears to be due to narrowing but not complete obstruction of gastric outlet due to severe gastritis in turn due to extensive NSAID use and daily alcohol use.  Recommend completely stopping alcohol and NSAIDS for now, including stopping daily aspirin.  Transition to twice daily protonix IV from drip, plan to continue twice daily oral dosing on discharge.  Use tylenol for chronic back pain - would recommend avoiding narcotics for this if able, but would prefer these over ongoing  NSAID use in this case at least in the short term.  Continue on liquids tonight, advance to soft diet in AM if doing well.   9/3/2017 -- doing well, " tolerating normal diet - ok for discharge today on omeprazole twice daily.  Remain off NSAIDS and alcohol - gave a few norco in case needed for pain in place of NSAIDS but advised minimizing use.   Follow-up with primary care provider in 1 week - recheck hemoglobin and reassess symptoms at that time.  Can discuss reducing to once daily at that time.      Anemia  9/3/2017 -- hemoglobin mildly down today but stable on recheck at noon and no black or bloody stools or other evidence of ongoing bleed/loss.  Was 12.7 on discharge, anticipate mild drop is just due to dilution.  Recommend recheck of hemoglobin at follow-up with primary care provider in 1 week.      Mildly elevated creatinine, unclear chronicity.   9/2/2017 -- faintly up - follow.       Moderately elevated calcium, unclear etiology.   9/2/2017 -- resolved.     Very mildly elevated lipase.   9/2/2017 -- normalized overnight, doubt pancreatitis.        Daily Alcohol use  9/1/17 -- 2 drinks per day. He has no history of withdrawal or alcohol-related problems.  I did not put him on CIWA at this time, but we will watch him closely.   9/3/2017 -- doing well, no symptoms of withdrawal.  Recommend quitting on discharge.                Discharge Instructions and Follow-Up:   Discharge diet: Regular   Discharge activity: Activity as tolerated   Discharge follow-up: Follow up with primary care provider in 7 days, recheck hemoglobin and discuss long-term plan for omeprazole at that time.             Discharge Disposition:   Discharged to home      Attestation:  I have reviewed today's vital signs, notes, medications, labs and imaging.  Amount of time performed on this discharge summary: 45 minutes.    Dillan Troy MD, MD

## 2017-09-03 NOTE — PLAN OF CARE
Problem: Nausea/Vomiting (Adult)  Goal: Identify Related Risk Factors and Signs and Symptoms  Related risk factors and signs and symptoms are identified upon initiation of [employer] Clinical Practice Guideline (CPG)   Outcome: Improving  Patient tolerating his soft diet at breakfast. Advanced to regular diet for lunch. Up and ambulating in hallway independently. Had a large loose blackish colored stool this am. BS positive. No abdominal discomfort. Abdomen rounded with BS + x 4 quads. VSS. Plan is to discharge if tolerating a regular diet. Will continue to monitor.

## 2017-09-03 NOTE — PROGRESS NOTES
[facility] DISCHARGE NOTE    Patient discharged to home at 2:22 PM via ambulation. Accompanied by spouse and staff. Discharge instructions reviewed with patient, opportunity offered to ask questions. Prescriptions sent to patients preferred pharmacy. All belongings sent with patient.    Jud Carmona

## 2017-09-03 NOTE — PROGRESS NOTES
Pt tolerating full liquid dinner & fluids well. Denies abdominal pain. Up in room independently. Lets needs be known. IV fluids infusing per order from on call provider. VSS, afebrile. Cherie Negron RN

## 2017-09-06 LAB — COPATH REPORT: NORMAL

## 2022-08-03 ENCOUNTER — HOSPITAL ENCOUNTER (OUTPATIENT)
Dept: NUCLEAR MEDICINE | Facility: CLINIC | Age: 72
Setting detail: NUCLEAR MEDICINE
Discharge: HOME | End: 2022-08-03
Attending: INTERNAL MEDICINE
Payer: COMMERCIAL

## 2022-08-03 ENCOUNTER — HOSPITAL ENCOUNTER (OUTPATIENT)
Dept: CARDIOLOGY | Facility: CLINIC | Age: 72
Discharge: HOME | End: 2022-08-03
Attending: INTERNAL MEDICINE
Payer: COMMERCIAL

## 2022-08-03 ENCOUNTER — HOSPITAL ENCOUNTER (OUTPATIENT)
Dept: NUCLEAR MEDICINE | Facility: CLINIC | Age: 72
Setting detail: NUCLEAR MEDICINE
Discharge: HOME | End: 2022-08-03
Attending: INTERNAL MEDICINE | Admitting: INTERNAL MEDICINE
Payer: COMMERCIAL

## 2022-08-03 VITALS — WEIGHT: 220 LBS | HEIGHT: 70 IN | BODY MASS INDEX: 31.5 KG/M2

## 2022-08-03 DIAGNOSIS — Z01.89 ENCOUNTER FOR OTHER SPECIFIED SPECIAL EXAMINATIONS: ICD-10-CM

## 2022-08-03 LAB
CV STRESS MAX HR HE: 141
NUC STRESS EJECTION FRACTION: 52 %
RATE PRESSURE PRODUCT: NORMAL
STRESS ANGINA INDEX: 0
STRESS ECHO BASELINE DIASTOLIC HE: 78
STRESS ECHO BASELINE HR: 47 BPM
STRESS ECHO BASELINE SYSTOLIC BP: 144
STRESS ECHO CALCULATED PERCENT HR: 94 %
STRESS ECHO LAST STRESS DIASTOLIC BP: 78
STRESS ECHO LAST STRESS SYSTOLIC BP: 200
STRESS ECHO POST ESTIMATED WORKLOAD: 10.1 METS
STRESS ECHO POST EXERCISE DUR MIN: 9 MIN
STRESS ECHO POST EXERCISE DUR SEC: 21 SEC
STRESS ECHO TARGET HR: 150

## 2022-08-03 PROCEDURE — 343N000001 HC RX 343

## 2022-08-03 PROCEDURE — 78452 HT MUSCLE IMAGE SPECT MULT: CPT

## 2022-08-03 PROCEDURE — 78452 HT MUSCLE IMAGE SPECT MULT: CPT | Mod: 26 | Performed by: INTERNAL MEDICINE

## 2022-08-03 PROCEDURE — 93018 CV STRESS TEST I&R ONLY: CPT | Performed by: INTERNAL MEDICINE

## 2022-08-03 PROCEDURE — 93016 CV STRESS TEST SUPVJ ONLY: CPT | Performed by: INTERNAL MEDICINE

## 2022-08-03 PROCEDURE — A9502 TC99M TETROFOSMIN: HCPCS

## 2022-08-03 PROCEDURE — 93017 CV STRESS TEST TRACING ONLY: CPT

## 2022-08-03 RX ADMIN — TETROFOSMIN 32.5 MCI.: 1.38 INJECTION, POWDER, LYOPHILIZED, FOR SOLUTION INTRAVENOUS at 09:53

## 2022-08-03 RX ADMIN — TETROFOSMIN 11 MCI.: 1.38 INJECTION, POWDER, LYOPHILIZED, FOR SOLUTION INTRAVENOUS at 08:20

## 2024-10-05 ENCOUNTER — HOSPITAL ENCOUNTER (EMERGENCY)
Facility: CLINIC | Age: 74
Discharge: HOME | End: 2024-10-05
Payer: COMMERCIAL

## 2024-10-05 VITALS
TEMPERATURE: 97.8 F | HEART RATE: 98 BPM | OXYGEN SATURATION: 99 % | SYSTOLIC BLOOD PRESSURE: 137 MMHG | RESPIRATION RATE: 18 BRPM | DIASTOLIC BLOOD PRESSURE: 80 MMHG

## 2024-10-05 DIAGNOSIS — S61.209A AVULSION OF SKIN OF FINGER, INITIAL ENCOUNTER: ICD-10-CM

## 2024-10-05 PROCEDURE — 90471 IMMUNIZATION ADMIN: CPT

## 2024-10-05 PROCEDURE — 99203 OFFICE O/P NEW LOW 30 MIN: CPT

## 2024-10-05 PROCEDURE — 90715 TDAP VACCINE 7 YRS/> IM: CPT

## 2024-10-05 PROCEDURE — 250N000011 HC RX IP 250 OP 636

## 2024-10-05 PROCEDURE — G0463 HOSPITAL OUTPT CLINIC VISIT: HCPCS | Mod: 25

## 2024-10-05 RX ADMIN — CLOSTRIDIUM TETANI TOXOID ANTIGEN (FORMALDEHYDE INACTIVATED), CORYNEBACTERIUM DIPHTHERIAE TOXOID ANTIGEN (FORMALDEHYDE INACTIVATED), BORDETELLA PERTUSSIS TOXOID ANTIGEN (GLUTARALDEHYDE INACTIVATED), BORDETELLA PERTUSSIS FILAMENTOUS HEMAGGLUTININ ANTIGEN (FORMALDEHYDE INACTIVATED), BORDETELLA PERTUSSIS PERTACTIN ANTIGEN, AND BORDETELLA PERTUSSIS FIMBRIAE 2/3 ANTIGEN 0.5 ML: 5; 2; 2.5; 5; 3; 5 INJECTION, SUSPENSION INTRAMUSCULAR at 10:36

## 2024-10-05 ASSESSMENT — COLUMBIA-SUICIDE SEVERITY RATING SCALE - C-SSRS
6. HAVE YOU EVER DONE ANYTHING, STARTED TO DO ANYTHING, OR PREPARED TO DO ANYTHING TO END YOUR LIFE?: NO
1. IN THE PAST MONTH, HAVE YOU WISHED YOU WERE DEAD OR WISHED YOU COULD GO TO SLEEP AND NOT WAKE UP?: NO
2. HAVE YOU ACTUALLY HAD ANY THOUGHTS OF KILLING YOURSELF IN THE PAST MONTH?: NO

## 2024-10-05 NOTE — ED PROVIDER NOTES
History     Chief Complaint   Patient presents with    Laceration     HPI  Diogo Wilburn is a 74 year old male who presents for evaluation for an injury to his left index finger that occurred just prior to arrival.  He was using a saw to cut rebar when he accidentally cut through the skin of his left index finger.  He immediately presented here for evaluation, bleeding well-controlled at this time.  Reports unsure of tetanus status so would like a booster today.  He does not have changes to distal motor or sensation.  Has not taken anything for pain yet.  No other injuries.    Allergies:  No Known Allergies    Problem List:    Patient Active Problem List    Diagnosis Date Noted    Gastric outlet obstruction 2017     Priority: Medium    Disorder of lipoid metabolism 2005     Priority: Medium     Problem list name updated by automated process. Provider to review      Lumbago 2005     Priority: Medium    Abnormal glucose 2005     Priority: Medium     Problem list name updated by automated process. Provider to review          Past Medical History:    Past Medical History:   Diagnosis Date    Glycosuria     Lumbago     Other abnormal glucose     Unspecified disorder of lipoid metabolism        Past Surgical History:    Past Surgical History:   Procedure Laterality Date    SURGICAL HISTORY OF -       carpal tunnel release, bilateral    SURGICAL HISTORY OF -       vasectomy    SURGICAL HISTORY OF -       umbilical hernia repair    SURGICAL HISTORY OF -   10/29/2002    colonoscopy       Family History:    Family History   Problem Relation Age of Onset    Hypertension Mother     Heart Disease Father     Heart Disease Maternal Grandfather        Social History:  Marital Status:   [2]  Social History     Tobacco Use    Smoking status: Former     Current packs/day: 0.00     Types: Cigarettes     Quit date: 1984     Years since quittin.1   Substance Use Topics    Alcohol  use: Yes     Alcohol/week: 5.8 standard drinks of alcohol     Types: 7 Shots of liquor per week     Comment: daily    Drug use: No        Medications:    ASPIRIN 81 MG OR TABS  FENOFIBRATE PO  HYDROcodone-acetaminophen (NORCO) 5-325 MG per tablet  latanoprost (XALATAN) 0.005 % ophthalmic solution  omeprazole (PRILOSEC) 20 MG CR capsule  polyethylene glycol (MIRALAX/GLYCOLAX) powder  SIMVASTATIN PO          Review of Systems  Pertinent review of systems as documented per HPI above.    Physical Exam   BP: 137/80  Pulse: 98  Temp: 97.8  F (36.6  C)  Resp: 18  SpO2: 99 %      Physical Exam  Vitals and nursing note reviewed.   Constitutional:       General: He is not in acute distress.     Appearance: Normal appearance. He is not ill-appearing, toxic-appearing or diaphoretic.   HENT:      Head: Atraumatic.   Cardiovascular:      Rate and Rhythm: Normal rate.   Pulmonary:      Effort: Pulmonary effort is normal. No respiratory distress.   Musculoskeletal:      Left hand: No swelling, deformity, tenderness or bony tenderness. Normal range of motion. Normal strength. Normal sensation. Normal capillary refill. Normal pulse.      Comments: Approximately 1cm x 0.25cm avulsion of skin of left dorsal index finger extending into dermal layer. Full ROM, strength and and sensation at the finger with minimal tenderness localized to area of injury.    Skin:     General: Skin is warm and dry.      Capillary Refill: Capillary refill takes less than 2 seconds.   Neurological:      General: No focal deficit present.      Mental Status: He is alert and oriented to person, place, and time.   Psychiatric:         Mood and Affect: Mood normal.         Behavior: Behavior normal.         ED Course       Medications   Tdap (tetanus-diphtheria-acell pertussis) (ADACEL) injection 0.5 mL (0.5 mLs Intramuscular $Given 10/5/24 1036)       Assessments & Plan (with Medical Decision Making)     I have reviewed the nursing notes.    I have reviewed the  findings, diagnosis, plan and need for follow up with the patient.  74-year-old male who presents for evaluation of injury to his left index finger that occurred just prior to arrival.  Was using a saw to cut rebar when he excellently cut the skin of his left index finger.  He denies any changes to distal sensation or motor function.  Unsure of tetanus status and would like this updated today.  No other injuries. Pt well-appearing on arrival with VS WNL.  There is an approximately 1cm x 0.25cm avulsion of the skin of the left dorsal index finger extending into the dermal layer.  He has full ROM, strength and sensation at the finger with minimal tenderness localized to area of injury.  Rest of exam as above. Since skin was avulsed at the time of the injury, closure is not possible as wound edges do not come together.  The wound was cleansed with Shur-Clens and saline, bacitracin and Telfa was applied with tube gauze for protection.  Advised the patient to remove the dressing in 24 hours, cleanse the area with gentle soap and water, apply bacitracin, Telfa and gauze.  Recommended dressing changes daily, sooner if the dressing is soiled. Recommended f/u in primary clinic recheck. Recommended to watch for signs of infection or changes to distal sensation or motor function, advised to return sooner if he notices any of these concerns.  All questions answered.  Patient verbalizes understanding and agreement with the above plan.    Disclaimer: This note consists of symbols derived from keyboarding, dictation, and/or voice recognition software. As a result, there may be errors in the script that have gone undetected.  Please consider this when interpreting information found in the chart.    Discharge Medication List as of 10/5/2024 10:49 AM          Final diagnoses:   Avulsion of skin of finger, initial encounter       10/5/2024   [facility] EMERGENCY DEPT       Mare Zaidi PA-C  10/05/24 [id number]

## 2024-10-05 NOTE — DISCHARGE INSTRUCTIONS
-Leave this dressing on for 24 hours.  After this, you can take it off, clean the area gently with mild soap and water, apply the Telfa pad and then wrap with gauze.  Change your dressings every 24 hours or sooner if they get soaked through.  -Watch for any signs of infection like increased pain, swelling, redness or puslike drainage, if you notice any of these then please return for evaluation.  -Also return if you notice any changes to the movement at your finger, numbness or tingling or anything else that concerns you.

## 2024-11-22 ENCOUNTER — HOSPITAL ENCOUNTER (EMERGENCY)
Facility: CLINIC | Age: 74
Discharge: HOME | End: 2024-11-22
Attending: NURSE PRACTITIONER
Payer: COMMERCIAL

## 2024-11-22 ENCOUNTER — APPOINTMENT (OUTPATIENT)
Dept: GENERAL RADIOLOGY | Facility: CLINIC | Age: 74
End: 2024-11-22
Attending: NURSE PRACTITIONER
Payer: COMMERCIAL

## 2024-11-22 VITALS
SYSTOLIC BLOOD PRESSURE: 191 MMHG | OXYGEN SATURATION: 98 % | TEMPERATURE: 97.9 F | RESPIRATION RATE: 18 BRPM | HEART RATE: 53 BPM | DIASTOLIC BLOOD PRESSURE: 90 MMHG

## 2024-11-22 DIAGNOSIS — S39.012A STRAIN OF LUMBAR REGION, INITIAL ENCOUNTER: ICD-10-CM

## 2024-11-22 PROCEDURE — 99283 EMERGENCY DEPT VISIT LOW MDM: CPT | Performed by: NURSE PRACTITIONER

## 2024-11-22 PROCEDURE — 72100 X-RAY EXAM L-S SPINE 2/3 VWS: CPT

## 2024-11-22 ASSESSMENT — ACTIVITIES OF DAILY LIVING (ADL)
ADLS_ACUITY_SCORE: 0
ADLS_ACUITY_SCORE: 0

## 2024-11-22 ASSESSMENT — COLUMBIA-SUICIDE SEVERITY RATING SCALE - C-SSRS
2. HAVE YOU ACTUALLY HAD ANY THOUGHTS OF KILLING YOURSELF IN THE PAST MONTH?: NO
6. HAVE YOU EVER DONE ANYTHING, STARTED TO DO ANYTHING, OR PREPARED TO DO ANYTHING TO END YOUR LIFE?: NO
1. IN THE PAST MONTH, HAVE YOU WISHED YOU WERE DEAD OR WISHED YOU COULD GO TO SLEEP AND NOT WAKE UP?: NO

## 2024-11-22 NOTE — ED TRIAGE NOTES
Patient fell on concrete lid earlier today. Pt reports pain in lower back. Took 650 mg tylenol and methocarbamol before coming in.

## 2024-11-23 NOTE — DISCHARGE INSTRUCTIONS
Recommend use of heat to help with stretching advise staying mobile as possible to prevent stiffness.  If you were develop loss or change of bowel or bladder function or sensation you should return for prompt evaluation.  Follow-up with your [facility] providers if you are having any ongoing complications or return here if you have any questions or ongoing conditions related to this.  Pleasure to meet you today

## (undated) RX ORDER — GLYCOPYRROLATE 0.2 MG/ML
INJECTION, SOLUTION INTRAMUSCULAR; INTRAVENOUS
Status: DISPENSED
Start: 2017-09-02

## (undated) RX ORDER — PROPOFOL 10 MG/ML
INJECTION, EMULSION INTRAVENOUS
Status: DISPENSED
Start: 2017-09-02

## (undated) RX ORDER — LIDOCAINE HYDROCHLORIDE 10 MG/ML
INJECTION, SOLUTION EPIDURAL; INFILTRATION; INTRACAUDAL; PERINEURAL
Status: DISPENSED
Start: 2017-09-02